# Patient Record
Sex: FEMALE | Race: WHITE
[De-identification: names, ages, dates, MRNs, and addresses within clinical notes are randomized per-mention and may not be internally consistent; named-entity substitution may affect disease eponyms.]

---

## 2019-04-26 ENCOUNTER — HOSPITAL ENCOUNTER (OUTPATIENT)
Dept: HOSPITAL 10 - SDS | Age: 29
Discharge: HOME | End: 2019-04-26
Attending: SURGERY
Payer: COMMERCIAL

## 2019-04-26 ENCOUNTER — HOSPITAL ENCOUNTER (OUTPATIENT)
Dept: HOSPITAL 91 - SDS | Age: 29
Discharge: HOME | End: 2019-04-26
Payer: COMMERCIAL

## 2019-04-26 VITALS — DIASTOLIC BLOOD PRESSURE: 58 MMHG | HEART RATE: 64 BPM | RESPIRATION RATE: 18 BRPM | SYSTOLIC BLOOD PRESSURE: 95 MMHG

## 2019-04-26 VITALS — RESPIRATION RATE: 16 BRPM | DIASTOLIC BLOOD PRESSURE: 72 MMHG | SYSTOLIC BLOOD PRESSURE: 125 MMHG | HEART RATE: 53 BPM

## 2019-04-26 VITALS — DIASTOLIC BLOOD PRESSURE: 55 MMHG | HEART RATE: 54 BPM | SYSTOLIC BLOOD PRESSURE: 96 MMHG | RESPIRATION RATE: 26 BRPM

## 2019-04-26 VITALS — DIASTOLIC BLOOD PRESSURE: 56 MMHG | RESPIRATION RATE: 20 BRPM | SYSTOLIC BLOOD PRESSURE: 124 MMHG | HEART RATE: 80 BPM

## 2019-04-26 VITALS — HEART RATE: 66 BPM | SYSTOLIC BLOOD PRESSURE: 88 MMHG | DIASTOLIC BLOOD PRESSURE: 51 MMHG | RESPIRATION RATE: 22 BRPM

## 2019-04-26 VITALS — RESPIRATION RATE: 26 BRPM | HEART RATE: 52 BPM | SYSTOLIC BLOOD PRESSURE: 95 MMHG | DIASTOLIC BLOOD PRESSURE: 53 MMHG

## 2019-04-26 VITALS — HEART RATE: 68 BPM | SYSTOLIC BLOOD PRESSURE: 103 MMHG | RESPIRATION RATE: 18 BRPM | DIASTOLIC BLOOD PRESSURE: 56 MMHG

## 2019-04-26 VITALS — SYSTOLIC BLOOD PRESSURE: 97 MMHG | DIASTOLIC BLOOD PRESSURE: 55 MMHG | RESPIRATION RATE: 18 BRPM | HEART RATE: 62 BPM

## 2019-04-26 VITALS — SYSTOLIC BLOOD PRESSURE: 98 MMHG | HEART RATE: 68 BPM | DIASTOLIC BLOOD PRESSURE: 54 MMHG | RESPIRATION RATE: 31 BRPM

## 2019-04-26 VITALS — HEART RATE: 64 BPM | SYSTOLIC BLOOD PRESSURE: 100 MMHG | RESPIRATION RATE: 18 BRPM | DIASTOLIC BLOOD PRESSURE: 57 MMHG

## 2019-04-26 VITALS — HEART RATE: 80 BPM | DIASTOLIC BLOOD PRESSURE: 69 MMHG | SYSTOLIC BLOOD PRESSURE: 132 MMHG | RESPIRATION RATE: 20 BRPM

## 2019-04-26 VITALS
BODY MASS INDEX: 21.87 KG/M2 | WEIGHT: 139.33 LBS | WEIGHT: 139.33 LBS | BODY MASS INDEX: 21.87 KG/M2 | HEIGHT: 67 IN | BODY MASS INDEX: 21.87 KG/M2 | HEIGHT: 67 IN

## 2019-04-26 VITALS — HEART RATE: 66 BPM | RESPIRATION RATE: 16 BRPM | SYSTOLIC BLOOD PRESSURE: 92 MMHG | DIASTOLIC BLOOD PRESSURE: 54 MMHG

## 2019-04-26 DIAGNOSIS — K43.9: Primary | ICD-10-CM

## 2019-04-26 PROCEDURE — 49653: CPT

## 2019-04-26 RX ADMIN — IBUPROFEN 1 MG: 800 TABLET, FILM COATED ORAL at 15:18

## 2019-04-26 RX ADMIN — OXYCODONE HYDROCHLORIDE AND ACETAMINOPHEN 1 TAB: 5; 325 TABLET ORAL at 14:22

## 2019-04-26 RX ADMIN — FENTANYL CITRATE 1 MCG: 50 INJECTION, SOLUTION INTRAMUSCULAR; INTRAVENOUS at 13:07

## 2019-04-26 NOTE — OPR
Date/Time of Note


Date/Time of Note


DATE: 4/26/19 


TIME: 13:02





Operative Report


Procedure Date:  Apr 26, 2019


Preoperative Diagnosis


incarcerated ventral hernia


Postoperative Diagnosis


same


Operation/Procedure Performed


1. laparoscopic ventral hernia repair


2. implantation of bardsoft 15 x 15 cm mesh


Surgeon


see signature line


Assistant


none


Anesthesia Type:  general


Estimated Blood Loss:  0 - 10 ml's


Transfusion


   none


Specimen


none


Grafts/Implants


none


Complications


none


Pt Condition Post Procedure:  stable


Indications


This is a 29-year-old female with symptomatic incarcerated hernia.  She required


surgical repair.  Risks alternatives benefits and personally discussed the 


patient.  Patient expressed understanding consents to the operation.


Procedure Description


Patient is taken to the OR and prepped and draped in usual sterile fashion.  


Surgical time was performed.  IV antibiotics were given.  Left upper quadrant 5 


mm transverse incision with a 15 blade.  Using a 5 mm optical trocar optical 


entry performed.  Pneumoperitoneum is established.  Left flank 12 mm optical 


trochars placed under direct physician.  Left lower quadrant 5 mm optical 


trochars were placed in direct position.  Upon initial inspection there is 


incarcerated contents to the ventral hernia.  Lap scopic lysis of adhesions 


performed with lap scopic harmonic to lyse the adhesions and reduce the 


contents.











ROMÁN NELSON                  Apr 26, 2019 13:04

## 2019-04-26 NOTE — PREAC
Date/Time of Note


Date/Time of Note


DATE: 4/26/19 


TIME: 11:59





Anesthesia Eval and Record


Evaluation


Time Pre-Procedure Interview


DATE: 4/26/19 


TIME: 11:59


Age


29


Sex


female


NPO:  8 hrs


Preoperative diagnosis


ventral hernia


Planned procedure


lap ventral hernia repair





Past Medical History


Past Medical History:  Includes


Heme:  Thrombocytopenia





Surgery & Anesthesia Issues


No known issue





Meds


Anticoagulation:  No


Beta Blocker within 24 hr:  No


Reason Beta Blocker not given:  Pt. not on B-Blocker


No Active Prescriptions or Reported Meds





Current Medications


Cefazolin Sodium/ Dextrose 50 ml @  100 mls/hr PRE-OP IVPB ;  Start 4/26/19 at 


06:00;  Stop 4/26/19 at 15:00


Meds reviewed:  Yes





Allergies


Coded Allergies:  


     shrimp (Verified  Allergy, Unknown, RASH HIVES, 4/26/19)


     acetaminophen (Verified  Adverse Reaction, Unknown, VOMITING NAUSEA, 


4/26/19)


     hydrocodone (Verified  Adverse Reaction, Unknown, VOMITING NAUSEA, 4/26/19)


Allergies Reviewed:  Yes





Labs/Studies


Labs Reviewed:  Reviewed by anesthesiologist


Pregnancy test:  Negative


Studies:  ECG (n/a), CXR (n/a)





Pre-procedure Exam


Last vitals





Vital Signs


  Date      Temp  Pulse  Resp  B/P (MAP)   Pulse Ox  O2          O2 Flow    FiO2


Time                                                 Delivery    Rate


   4/26/19  97.7     53    16      125/72       100


     11:53                           (89)





Airway:  Adequate mouth opening


Mallampati:  Mallampati I


Teeth:  Normal


Lung:  Normal


Heart:  Normal





ASA Physical Status


ASA physical status:  2


Emergency:  None





Planned Anesthetic


General/MAC:  ETT


Nerve block:  TAP (bilateral)





Planned Pain Management


Single shot nerve block, Parenteral pain med





Pre-operative Attestations


Prior to commencing anesthesia and surgery, the patient was re-evaluated, there 


was verification of:


*The patient's identity


*The results of appropriate recent lab work and preoperative vital signs


*The above evaluation not changing prior to induction


*Anesthetic plan, risk benefits, alternative and complications discussed with 


patient/family; questions answered; patient/family understands, accepts and wis


hes to proceed.











GUADALUPE MCDONALD MD            Apr 26, 2019 12:00